# Patient Record
Sex: MALE | Race: BLACK OR AFRICAN AMERICAN | Employment: UNEMPLOYED | URBAN - METROPOLITAN AREA
[De-identification: names, ages, dates, MRNs, and addresses within clinical notes are randomized per-mention and may not be internally consistent; named-entity substitution may affect disease eponyms.]

---

## 2024-01-12 ENCOUNTER — HOSPITAL ENCOUNTER (EMERGENCY)
Age: 34
Discharge: HOME | End: 2024-01-12
Attending: EMERGENCY MEDICINE
Payer: MEDICARE

## 2024-01-12 ENCOUNTER — APPOINTMENT (OUTPATIENT)
Dept: GENERAL RADIOLOGY | Age: 34
End: 2024-01-12
Payer: MEDICARE

## 2024-01-12 VITALS
TEMPERATURE: 99.4 F | WEIGHT: 200 LBS | SYSTOLIC BLOOD PRESSURE: 145 MMHG | OXYGEN SATURATION: 95 % | RESPIRATION RATE: 20 BRPM | BODY MASS INDEX: 29.62 KG/M2 | HEIGHT: 69 IN | DIASTOLIC BLOOD PRESSURE: 80 MMHG | HEART RATE: 90 BPM

## 2024-01-12 DIAGNOSIS — J11.1 INFLUENZA-LIKE ILLNESS: ICD-10-CM

## 2024-01-12 DIAGNOSIS — J20.8 ACUTE BRONCHITIS DUE TO OTHER SPECIFIED ORGANISMS: Primary | ICD-10-CM

## 2024-01-12 LAB
ALBUMIN SERPL-MCNC: 3.2 G/DL (ref 3.5–5)
ALBUMIN/GLOB SERPL: 0.7 (ref 0.4–1.6)
ALP SERPL-CCNC: 78 U/L (ref 50–136)
ALT SERPL-CCNC: 41 U/L (ref 12–65)
ANION GAP SERPL CALC-SCNC: 5 MMOL/L (ref 2–11)
AST SERPL-CCNC: 54 U/L (ref 15–37)
BASOPHILS # BLD: 0 K/UL (ref 0–0.2)
BASOPHILS NFR BLD: 0 % (ref 0–2)
BILIRUB SERPL-MCNC: 0.3 MG/DL (ref 0.2–1.1)
BUN SERPL-MCNC: 17 MG/DL (ref 6–23)
CALCIUM SERPL-MCNC: 9 MG/DL (ref 8.3–10.4)
CHLORIDE SERPL-SCNC: 107 MMOL/L (ref 103–113)
CO2 SERPL-SCNC: 24 MMOL/L (ref 21–32)
CREAT SERPL-MCNC: 0.8 MG/DL (ref 0.8–1.5)
DIFFERENTIAL METHOD BLD: ABNORMAL
EOSINOPHIL # BLD: 0 K/UL (ref 0–0.8)
EOSINOPHIL NFR BLD: 0 % (ref 0.5–7.8)
ERYTHROCYTE [DISTWIDTH] IN BLOOD BY AUTOMATED COUNT: 13.2 % (ref 11.9–14.6)
EST. AVERAGE GLUCOSE BLD GHB EST-MCNC: 111 MG/DL
FLUAV RNA SPEC QL NAA+PROBE: NOT DETECTED
FLUBV RNA SPEC QL NAA+PROBE: NOT DETECTED
GLOBULIN SER CALC-MCNC: 4.5 G/DL (ref 2.8–4.5)
GLUCOSE SERPL-MCNC: 108 MG/DL (ref 65–100)
HBA1C MFR BLD: 5.5 % (ref 4.8–5.6)
HCT VFR BLD AUTO: 41.6 % (ref 41.1–50.3)
HGB BLD-MCNC: 13.7 G/DL (ref 13.6–17.2)
IMM GRANULOCYTES # BLD AUTO: 0 K/UL (ref 0–0.5)
IMM GRANULOCYTES NFR BLD AUTO: 0 % (ref 0–5)
LYMPHOCYTES # BLD: 1.7 K/UL (ref 0.5–4.6)
LYMPHOCYTES NFR BLD: 13 % (ref 13–44)
MCH RBC QN AUTO: 26.8 PG (ref 26.1–32.9)
MCHC RBC AUTO-ENTMCNC: 32.9 G/DL (ref 31.4–35)
MCV RBC AUTO: 81.3 FL (ref 82–102)
MONOCYTES # BLD: 1.4 K/UL (ref 0.1–1.3)
MONOCYTES NFR BLD: 10 % (ref 4–12)
NEUTS SEG # BLD: 10.3 K/UL (ref 1.7–8.2)
NEUTS SEG NFR BLD: 77 % (ref 43–78)
NRBC # BLD: 0 K/UL (ref 0–0.2)
PLATELET # BLD AUTO: 282 K/UL (ref 150–450)
PMV BLD AUTO: 9.1 FL (ref 9.4–12.3)
POTASSIUM SERPL-SCNC: 4 MMOL/L (ref 3.5–5.1)
PROT SERPL-MCNC: 7.7 G/DL (ref 6.3–8.2)
RBC # BLD AUTO: 5.12 M/UL (ref 4.23–5.6)
SARS-COV-2 RDRP RESP QL NAA+PROBE: NOT DETECTED
SODIUM SERPL-SCNC: 136 MMOL/L (ref 136–146)
SOURCE: NORMAL
WBC # BLD AUTO: 13.5 K/UL (ref 4.3–11.1)

## 2024-01-12 PROCEDURE — 87635 SARS-COV-2 COVID-19 AMP PRB: CPT

## 2024-01-12 PROCEDURE — 71045 X-RAY EXAM CHEST 1 VIEW: CPT

## 2024-01-12 PROCEDURE — 99285 EMERGENCY DEPT VISIT HI MDM: CPT

## 2024-01-12 PROCEDURE — 93005 ELECTROCARDIOGRAM TRACING: CPT | Performed by: EMERGENCY MEDICINE

## 2024-01-12 PROCEDURE — 6370000000 HC RX 637 (ALT 250 FOR IP): Performed by: EMERGENCY MEDICINE

## 2024-01-12 PROCEDURE — 85025 COMPLETE CBC W/AUTO DIFF WBC: CPT

## 2024-01-12 PROCEDURE — 80053 COMPREHEN METABOLIC PANEL: CPT

## 2024-01-12 PROCEDURE — 83036 HEMOGLOBIN GLYCOSYLATED A1C: CPT

## 2024-01-12 PROCEDURE — 87502 INFLUENZA DNA AMP PROBE: CPT

## 2024-01-12 PROCEDURE — 87040 BLOOD CULTURE FOR BACTERIA: CPT

## 2024-01-12 RX ORDER — BROMPHENIRAMINE MALEATE, PSEUDOEPHEDRINE HYDROCHLORIDE, AND DEXTROMETHORPHAN HYDROBROMIDE 2; 30; 10 MG/5ML; MG/5ML; MG/5ML
5 SYRUP ORAL 4 TIMES DAILY PRN
Qty: 150 ML | Refills: 0 | Status: SHIPPED | OUTPATIENT
Start: 2024-01-12

## 2024-01-12 RX ORDER — AZITHROMYCIN 250 MG/1
TABLET, FILM COATED ORAL
Qty: 1 PACKET | Refills: 0 | Status: SHIPPED | OUTPATIENT
Start: 2024-01-12

## 2024-01-12 RX ORDER — ACETAMINOPHEN 500 MG
1000 TABLET ORAL
Status: COMPLETED | OUTPATIENT
Start: 2024-01-12 | End: 2024-01-12

## 2024-01-12 RX ADMIN — ACETAMINOPHEN 1000 MG: 500 TABLET ORAL at 21:05

## 2024-01-12 ASSESSMENT — PAIN - FUNCTIONAL ASSESSMENT: PAIN_FUNCTIONAL_ASSESSMENT: 0-10

## 2024-01-12 ASSESSMENT — PAIN DESCRIPTION - LOCATION: LOCATION: GENERALIZED

## 2024-01-12 ASSESSMENT — LIFESTYLE VARIABLES
HOW OFTEN DO YOU HAVE A DRINK CONTAINING ALCOHOL: NEVER
HOW MANY STANDARD DRINKS CONTAINING ALCOHOL DO YOU HAVE ON A TYPICAL DAY: PATIENT DOES NOT DRINK

## 2024-01-12 ASSESSMENT — PAIN SCALES - GENERAL: PAINLEVEL_OUTOF10: 5

## 2024-01-13 LAB
EKG ATRIAL RATE: 95 BPM
EKG DIAGNOSIS: NORMAL
EKG P AXIS: 62 DEGREES
EKG P-R INTERVAL: 117 MS
EKG Q-T INTERVAL: 342 MS
EKG QRS DURATION: 117 MS
EKG QTC CALCULATION (BAZETT): 428 MS
EKG R AXIS: 36 DEGREES
EKG T AXIS: 29 DEGREES
EKG VENTRICULAR RATE: 94 BPM

## 2024-01-13 PROCEDURE — 93010 ELECTROCARDIOGRAM REPORT: CPT | Performed by: INTERNAL MEDICINE

## 2024-01-13 NOTE — ED NOTES
I SPOKE WITH MS LORENZ. LAB RESULTS GIVEN.   I have reviewed discharge instructions with the patient.  The patient verbalized understanding.    Patient left ED via Discharge Method: ambulatory to Home with [facility].    Opportunity for questions and clarification provided.       Patient given 2 scripts.         To continue your aftercare when you leave the hospital, you may receive an automated call from our care team to check in on how you are doing.  This is a free service and part of our promise to provide the best care and service to meet your aftercare needs.” If you have questions, or wish to unsubscribe from this service please call [phone].  Thank you for Choosing our [facility] Emergency Department.       Torsten Grant RN  01/12/24 [id number]

## 2024-01-13 NOTE — ED PROVIDER NOTES
Emergency Department Provider Note       PCP: No primary care provider on file.   Age: 34 y.o.   Sex: male     DISPOSITION Decision To Discharge 01/12/2024 10:02:41 PM       ICD-10-CM    1. Acute bronchitis due to other specified organisms  J20.8       2. Influenza-like illness  J11.1           Medical Decision Making     Complexity of Problems Addressed:  1 or more acute illnesses that pose a threat to life or bodily function.     Data Reviewed and Analyzed:  I independently ordered and reviewed each unique test.             Discussion of management or test interpretation.  COVID and flu test were negative.  There is elevation of the white blood cell count slightly at 13,000 chest x-ray shows no evidence of pneumonia.  As the patient is in a group home will cover with Z-Darrel, although likely viral bronchitis.      Risk of Complications and/or Morbidity of Patient Management:  Prescription drug management performed.  Shared medical decision making was utilized in creating the patients health plan today.         History       Patient presents to the ER by EMS from the shelter where he is currently staying.  He has complaints of cough and congestion and shortness of breath as well as fever for the past 3 days.  Multiple sick contacts within the [facility] are noted with unknown diagnoses.  He denies nausea or vomiting.  He denies diarrhea and review of systems is otherwise negative.    The history is provided by the patient and medical records.        Review of Systems   Constitutional:  Negative for chills and fever.   All other systems reviewed and are negative.      Physical Exam     Vitals signs and nursing note reviewed:  Vitals:    01/12/24 2014 01/12/24 2200   BP: 117/82 (!) 124/101   Pulse: (!) 103 93   Resp: 18 27   Temp: (!) 102.6 °F (39.2 °C)    TempSrc: Oral    SpO2: 94%    Weight: 90.7 kg (200 lb)    Height: 1.753 m (5' 9\")       Physical Exam  Vitals and nursing note reviewed.   Constitutional:        - 4.5 g/dL    Albumin/Globulin Ratio 0.7 0.4 - 1.6     CBC with Auto Differential   Result Value Ref Range    WBC 13.5 (H) 4.3 - 11.1 K/uL    RBC 5.12 4.23 - 5.6 M/uL    Hemoglobin 13.7 13.6 - 17.2 g/dL    Hematocrit 41.6 41.1 - 50.3 %    MCV 81.3 (L) 82 - 102 FL    MCH 26.8 26.1 - 32.9 PG    MCHC 32.9 31.4 - 35.0 g/dL    RDW 13.2 11.9 - 14.6 %    Platelets 282 150 - 450 K/uL    MPV 9.1 (L) 9.4 - 12.3 FL    nRBC 0.00 0.0 - 0.2 K/uL    Differential Type AUTOMATED      Neutrophils % 77 43 - 78 %    Lymphocytes % 13 13 - 44 %    Monocytes % 10 4.0 - 12.0 %    Eosinophils % 0 (L) 0.5 - 7.8 %    Basophils % 0 0.0 - 2.0 %    Immature Granulocytes 0 0.0 - 5.0 %    Neutrophils Absolute 10.3 (H) 1.7 - 8.2 K/UL    Lymphocytes Absolute 1.7 0.5 - 4.6 K/UL    Monocytes Absolute 1.4 (H) 0.1 - 1.3 K/UL    Eosinophils Absolute 0.0 0.0 - 0.8 K/UL    Basophils Absolute 0.0 0.0 - 0.2 K/UL    Absolute Immature Granulocyte 0.0 0.0 - 0.5 K/UL   Hemoglobin A1C   Result Value Ref Range    Hemoglobin A1C 5.5 4.8 - 5.6 %    eAG 111 mg/dL   EKG 12 Lead   Result Value Ref Range    Ventricular Rate 94 BPM    Atrial Rate 95 BPM    P-R Interval 117 ms    QRS Duration 117 ms    Q-T Interval 342 ms    QTc Calculation (Bazett) 428 ms    P Axis 62 degrees    R Axis 36 degrees    T Axis 29 degrees    Diagnosis       Sinus rhythm  Borderline short MI interval  IRBBB and LPFB          XR CHEST PORTABLE   Final Result   No acute findings in the chest                           Voice dictation software was used during the making of this note.  This software is not perfect and grammatical and other typographical errors may be present.  This note has not been completely proofread for errors.     Ha Gordon,   01/12/24 [id number]

## 2024-01-13 NOTE — DISCHARGE INSTRUCTIONS
Acetaminophen (Tylenol) 650mg to 1000mg  OR  Ibuprofen (Motrin/Advil) 400mg to 600mg every 6 hours, as needed for fever, body aches.

## 2024-01-14 LAB
BACTERIA SPEC CULT: NORMAL
SERVICE CMNT-IMP: NORMAL

## 2024-01-17 LAB
BACTERIA SPEC CULT: NORMAL
SERVICE CMNT-IMP: NORMAL

## 2024-01-22 ENCOUNTER — OFFICE VISIT (OUTPATIENT)
Dept: FAMILY MEDICINE CLINIC | Age: 34
End: 2024-01-22

## 2024-01-22 VITALS
HEIGHT: 67 IN | TEMPERATURE: 97.3 F | OXYGEN SATURATION: 97 % | DIASTOLIC BLOOD PRESSURE: 88 MMHG | SYSTOLIC BLOOD PRESSURE: 139 MMHG | HEART RATE: 99 BPM | WEIGHT: 256.17 LBS | BODY MASS INDEX: 40.21 KG/M2 | RESPIRATION RATE: 15 BRPM

## 2024-01-22 DIAGNOSIS — Z71.89 ACP (ADVANCE CARE PLANNING): ICD-10-CM

## 2024-01-22 DIAGNOSIS — Z00.00 ENCOUNTER FOR WELL ADULT EXAM WITHOUT ABNORMAL FINDINGS: Primary | ICD-10-CM

## 2024-01-22 DIAGNOSIS — Z11.3 SCREEN FOR STD (SEXUALLY TRANSMITTED DISEASE): ICD-10-CM

## 2024-01-22 DIAGNOSIS — Z13.6 SCREENING FOR ISCHEMIC HEART DISEASE: ICD-10-CM

## 2024-01-22 DIAGNOSIS — Z59.82 LACK OF ACCESS TO TRANSPORTATION: ICD-10-CM

## 2024-01-22 DIAGNOSIS — E78.2 MIXED HYPERLIPIDEMIA: ICD-10-CM

## 2024-01-22 DIAGNOSIS — Z13.29 SCREENING FOR THYROID DISORDER: ICD-10-CM

## 2024-01-22 DIAGNOSIS — Z11.4 ENCOUNTER FOR SCREENING FOR HIV: ICD-10-CM

## 2024-01-22 DIAGNOSIS — Z11.59 NEED FOR HEPATITIS C SCREENING TEST: ICD-10-CM

## 2024-01-22 DIAGNOSIS — N48.89 PAINFUL PENIS: ICD-10-CM

## 2024-01-22 LAB
CHOLEST SERPL-MCNC: 146 MG/DL
HDLC SERPL-MCNC: 37 MG/DL (ref 40–60)
HDLC SERPL: 3.9
LDLC SERPL CALC-MCNC: 65.4 MG/DL
T4 SERPL-MCNC: 10.1 UG/DL (ref 4.8–13.9)
TRIGL SERPL-MCNC: 218 MG/DL (ref 35–150)
TSH, 3RD GENERATION: 0.97 UIU/ML (ref 0.36–3.74)
VLDLC SERPL CALC-MCNC: 43.6 MG/DL (ref 6–23)

## 2024-01-22 PROCEDURE — 99385 PREV VISIT NEW AGE 18-39: CPT | Performed by: NURSE PRACTITIONER

## 2024-01-22 PROCEDURE — G8484 FLU IMMUNIZE NO ADMIN: HCPCS | Performed by: NURSE PRACTITIONER

## 2024-01-22 RX ORDER — CEFTRIAXONE 1 G/1
1000 INJECTION, POWDER, FOR SOLUTION INTRAMUSCULAR; INTRAVENOUS ONCE
Status: CANCELLED | OUTPATIENT
Start: 2024-01-22 | End: 2024-01-22

## 2024-01-22 SDOH — ECONOMIC STABILITY - TRANSPORTATION SECURITY: TRANSPORTATION INSECURITY: Z59.82

## 2024-01-22 ASSESSMENT — ENCOUNTER SYMPTOMS
GASTROINTESTINAL NEGATIVE: 1
RESPIRATORY NEGATIVE: 1
EYES NEGATIVE: 1

## 2024-01-22 ASSESSMENT — PATIENT HEALTH QUESTIONNAIRE - PHQ9
SUM OF ALL RESPONSES TO PHQ QUESTIONS 1-9: 0
SUM OF ALL RESPONSES TO PHQ9 QUESTIONS 1 & 2: 0
SUM OF ALL RESPONSES TO PHQ QUESTIONS 1-9: 0
1. LITTLE INTEREST OR PLEASURE IN DOING THINGS: 0
SUM OF ALL RESPONSES TO PHQ QUESTIONS 1-9: 0
2. FEELING DOWN, DEPRESSED OR HOPELESS: 0
SUM OF ALL RESPONSES TO PHQ QUESTIONS 1-9: 0

## 2024-01-22 NOTE — PROGRESS NOTES
minutes reviewing previous notes, test results and face to face with the patient discussing the diagnosis and importance of compliance with the treatment plan as well as documenting on the day of the visit.      An electronic signature was used to authenticate this note.    --NOMI ROGEL - CNP

## 2024-01-23 LAB
HCV AB SER QL: NONREACTIVE
HIV 1+2 AB+HIV1 P24 AG SERPL QL IA: NONREACTIVE
HIV 1/2 RESULT COMMENT: NORMAL

## 2024-01-24 LAB
C TRACH RRNA SPEC QL NAA+PROBE: NEGATIVE
N GONORRHOEA RRNA SPEC QL NAA+PROBE: NEGATIVE
SPECIMEN SOURCE: NORMAL